# Patient Record
Sex: MALE | Race: WHITE | NOT HISPANIC OR LATINO | RURAL
[De-identification: names, ages, dates, MRNs, and addresses within clinical notes are randomized per-mention and may not be internally consistent; named-entity substitution may affect disease eponyms.]

---

## 2022-03-11 ENCOUNTER — OFFICE VISIT (OUTPATIENT)
Dept: RURAL CLINIC 2 | Facility: CLINIC | Age: 67
End: 2022-03-11
Payer: MEDICARE

## 2022-03-11 ENCOUNTER — LAB OUTSIDE AN ENCOUNTER (OUTPATIENT)
Dept: URBAN - METROPOLITAN AREA CLINIC 105 | Facility: CLINIC | Age: 67
End: 2022-03-11

## 2022-03-11 DIAGNOSIS — R19.8 BORBORYGMI: ICD-10-CM

## 2022-03-11 DIAGNOSIS — A09 DIARRHEA OF INFECTIOUS ORIGIN: ICD-10-CM

## 2022-03-11 DIAGNOSIS — R19.4 CHANGE IN BOWEL HABITS: ICD-10-CM

## 2022-03-11 DIAGNOSIS — R10.84 GENERALIZED ABDOMINAL PAIN: ICD-10-CM

## 2022-03-11 PROCEDURE — 99243 OFF/OP CNSLTJ NEW/EST LOW 30: CPT | Performed by: INTERNAL MEDICINE

## 2022-03-11 PROCEDURE — 99203 OFFICE O/P NEW LOW 30 MIN: CPT | Performed by: INTERNAL MEDICINE

## 2022-03-11 RX ORDER — CHROMIUM 200 MCG
TABLET ORAL
Qty: 0 | Refills: 0 | Status: DISCONTINUED | COMMUNITY
Start: 1900-01-01

## 2022-03-11 RX ORDER — SILDENAFIL 20 MG/1
1 TABLET TABLET, FILM COATED ORAL ONCE A DAY
Status: ACTIVE | COMMUNITY

## 2022-03-11 NOTE — HPI-TODAY'S VISIT:
Patient comes on referral from Dr. Justin Bright.  A copy of the consultation will be sent to the referring physician.  In March of 2017 Dr. Colunga did this patient screening colonoscopy that was normal. -  patient has been frequent loose stools for the last 2 months.  He had stool studies showing a negative stool culture negative C diff Giardia and O&P. He says that the middle of December he had what he thinks was covid. in January he had "14 straight days of diarrhea".  - On a bad at its worst, can go up to 8 times. Eating triggers. it is totally liquid and explosive. He has had a very few nocturnal stools. No blood in the stool.  no vomiting. no accidents but close calls.

## 2022-03-16 LAB
CALPROTECTIN, STOOL - QDX: (no result)
FECAL FAT, QUALITATIVE: (no result)
GASTROINTESTINAL PATHOGEN: (no result)
PANCREATICELASTASE ELISA, STOOL: (no result)

## 2022-03-17 ENCOUNTER — TELEPHONE ENCOUNTER (OUTPATIENT)
Dept: URBAN - METROPOLITAN AREA CLINIC 105 | Facility: CLINIC | Age: 67
End: 2022-03-17

## 2022-03-17 RX ORDER — PANCRELIPASE LIPASE, PANCRELIPASE PROTEASE, PANCRELIPASE AMYLASE 40000; 126000; 168000 [USP'U]/1; [USP'U]/1; [USP'U]/1
2 CAPSULES CAPSULE, DELAYED RELEASE ORAL
Qty: 200 | Refills: 5 | OUTPATIENT
Start: 2022-03-17 | End: 2022-09-13

## 2022-04-15 ENCOUNTER — OFFICE VISIT (OUTPATIENT)
Dept: RURAL CLINIC 2 | Facility: CLINIC | Age: 67
End: 2022-04-15
Payer: MEDICARE

## 2022-04-15 DIAGNOSIS — R14.0 BLOATING: ICD-10-CM

## 2022-04-15 DIAGNOSIS — R10.84 GENERALIZED ABDOMINAL PAIN: ICD-10-CM

## 2022-04-15 DIAGNOSIS — R14.3 FLATULENCE: ICD-10-CM

## 2022-04-15 DIAGNOSIS — K86.81 EXOCRINE PANCREATIC INSUFFICIENCY: ICD-10-CM

## 2022-04-15 PROCEDURE — 99214 OFFICE O/P EST MOD 30 MIN: CPT | Performed by: INTERNAL MEDICINE

## 2022-04-15 RX ORDER — SILDENAFIL 20 MG/1
1 TABLET TABLET, FILM COATED ORAL ONCE A DAY
COMMUNITY

## 2022-04-15 RX ORDER — PANCRELIPASE LIPASE, PANCRELIPASE PROTEASE, PANCRELIPASE AMYLASE 40000; 126000; 168000 [USP'U]/1; [USP'U]/1; [USP'U]/1
2 CAPSULES CAPSULE, DELAYED RELEASE ORAL
Qty: 200 | Refills: 5 | COMMUNITY
Start: 2022-03-17 | End: 2022-09-13

## 2022-04-15 NOTE — HPI-TODAY'S VISIT:
Patient comes on referral from Dr. Justin Bright.  A copy of the consultation will be sent to the referring physician.  In March of 2017 Dr. Colunga did this patient screening colonoscopy that was normal. -  patient has been frequent loose stools for the last 2 months.  He had stool studies showing a negative stool culture negative C diff Giardia and O&P. He says that the middle of December he had what he thinks was covid. in January he had "14 straight days of diarrhea".  - On a bad at its worst, can go up to 8 times. Eating triggers. it is totally liquid and explosive. He has had a very few nocturnal stools. No blood in the stool.  no vomiting. no accidents but close calls. - 4/15/2022: stool studies showed a slightly elevated calprotectin and EPI. started him on zenpep. he has been doing better. has still had a few loose stools. he says that on a bad day before he would have 10. now he is going only about 3 times. He feels much better and stronger.

## 2022-05-09 ENCOUNTER — TELEPHONE ENCOUNTER (OUTPATIENT)
Dept: URBAN - METROPOLITAN AREA CLINIC 92 | Facility: CLINIC | Age: 67
End: 2022-05-09

## 2022-05-13 ENCOUNTER — TELEPHONE ENCOUNTER (OUTPATIENT)
Dept: URBAN - METROPOLITAN AREA CLINIC 92 | Facility: CLINIC | Age: 67
End: 2022-05-13

## 2022-05-16 ENCOUNTER — LAB OUTSIDE AN ENCOUNTER (OUTPATIENT)
Dept: URBAN - METROPOLITAN AREA CLINIC 105 | Facility: CLINIC | Age: 67
End: 2022-05-16

## 2022-05-19 LAB
CALPROTECTIN, STOOL - QDX: (no result)
GASTROINTESTINAL PATHOGEN: (no result)

## 2022-07-17 ENCOUNTER — WEB ENCOUNTER (OUTPATIENT)
Dept: RURAL CLINIC 2 | Facility: CLINIC | Age: 67
End: 2022-07-17

## 2022-08-11 ENCOUNTER — OFFICE VISIT (OUTPATIENT)
Dept: RURAL CLINIC 2 | Facility: CLINIC | Age: 67
End: 2022-08-11
Payer: MEDICARE

## 2022-08-11 VITALS
HEIGHT: 70 IN | HEART RATE: 61 BPM | BODY MASS INDEX: 22.9 KG/M2 | SYSTOLIC BLOOD PRESSURE: 119 MMHG | TEMPERATURE: 98 F | DIASTOLIC BLOOD PRESSURE: 80 MMHG | WEIGHT: 160 LBS

## 2022-08-11 DIAGNOSIS — K86.81 EXOCRINE PANCREATIC INSUFFICIENCY: ICD-10-CM

## 2022-08-11 DIAGNOSIS — R19.5 LOOSE STOOLS: ICD-10-CM

## 2022-08-11 DIAGNOSIS — N28.1 RENAL CYST: ICD-10-CM

## 2022-08-11 PROCEDURE — 99214 OFFICE O/P EST MOD 30 MIN: CPT | Performed by: INTERNAL MEDICINE

## 2022-08-11 RX ORDER — PANCRELIPASE LIPASE, PANCRELIPASE PROTEASE, PANCRELIPASE AMYLASE 40000; 126000; 168000 [USP'U]/1; [USP'U]/1; [USP'U]/1
2 CAPSULES CAPSULE, DELAYED RELEASE ORAL
Qty: 200 | Refills: 5 | Status: ACTIVE | COMMUNITY
Start: 2022-03-17 | End: 2022-09-13

## 2022-08-11 RX ORDER — SILDENAFIL 20 MG/1
1 TABLET TABLET, FILM COATED ORAL ONCE A DAY
Status: ACTIVE | COMMUNITY

## 2022-08-11 NOTE — HPI-TODAY'S VISIT:
Patient comes on referral from Dr. Justin Bright.  A copy of the consultation will be sent to the referring physician.  In March of 2017 Dr. Colunga did this patient screening colonoscopy that was normal. -  patient has been frequent loose stools for the last 2 months.  He had stool studies showing a negative stool culture negative C diff Giardia and O&P. He says that the middle of December he had what he thinks was covid. in January he had "14 straight days of diarrhea".  - On a bad at its worst, can go up to 8 times. Eating triggers. it is totally liquid and explosive. He has had a very few nocturnal stools. No blood in the stool.  no vomiting. no accidents but close calls. - 4/15/2022: stool studies showed a slightly elevated calprotectin and EPI. started him on zenpep. he has been doing better. has still had a few loose stools. he says that on a bad day before he would have 10. now he is going only about 3 times. He feels much better and stronger. -  08/11/2022: Patient comes for follow-up.  He has exocrine pancreatic insufficiency and had a very good response to Zenpep.  He also wanted to do a ultrasound of the abdomen.  We did that it showed multiple renal cysts. - he said that he had covid a few weeks ago. he is doing well from diarrhea standpoint.

## 2022-09-16 ENCOUNTER — TELEPHONE ENCOUNTER (OUTPATIENT)
Dept: URBAN - METROPOLITAN AREA CLINIC 92 | Facility: CLINIC | Age: 67
End: 2022-09-16

## 2022-09-16 RX ORDER — PANCRELIPASE LIPASE, PANCRELIPASE PROTEASE, PANCRELIPASE AMYLASE 40000; 126000; 168000 [USP'U]/1; [USP'U]/1; [USP'U]/1
2 CAPSULES CAPSULE, DELAYED RELEASE ORAL
Qty: 200 | Refills: 5
Start: 2022-03-17 | End: 2023-03-15

## 2023-03-12 ENCOUNTER — ERX REFILL RESPONSE (OUTPATIENT)
Dept: RURAL CLINIC 2 | Facility: CLINIC | Age: 68
End: 2023-03-12

## 2023-03-12 RX ORDER — PANCRELIPASE LIPASE, PANCRELIPASE PROTEASE, PANCRELIPASE AMYLASE 40000; 126000; 168000 [USP'U]/1; [USP'U]/1; [USP'U]/1
2 CAPSULES ORALLY 3 TIMES A DAY WITH MEALS 30 DAYS CAPSULE, DELAYED RELEASE ORAL
Qty: 200 CAPSULE | Refills: 5 | OUTPATIENT

## 2023-03-12 RX ORDER — PANCRELIPASE LIPASE, PANCRELIPASE PROTEASE, PANCRELIPASE AMYLASE 40000; 126000; 168000 [USP'U]/1; [USP'U]/1; [USP'U]/1
2 CAPSULES CAPSULE, DELAYED RELEASE ORAL
Qty: 200 | Refills: 5 | OUTPATIENT

## 2023-06-09 ENCOUNTER — LAB OUTSIDE AN ENCOUNTER (OUTPATIENT)
Dept: RURAL CLINIC 2 | Facility: CLINIC | Age: 68
End: 2023-06-09

## 2023-06-09 ENCOUNTER — OFFICE VISIT (OUTPATIENT)
Dept: RURAL CLINIC 2 | Facility: CLINIC | Age: 68
End: 2023-06-09
Payer: MEDICARE

## 2023-06-09 VITALS
SYSTOLIC BLOOD PRESSURE: 131 MMHG | BODY MASS INDEX: 23.62 KG/M2 | HEIGHT: 70 IN | WEIGHT: 165 LBS | DIASTOLIC BLOOD PRESSURE: 79 MMHG | TEMPERATURE: 97.5 F | HEART RATE: 68 BPM

## 2023-06-09 DIAGNOSIS — K86.81 EXOCRINE PANCREATIC INSUFFICIENCY: ICD-10-CM

## 2023-06-09 DIAGNOSIS — K21.9 GASTROESOPHAGEAL REFLUX DISEASE, UNSPECIFIED WHETHER ESOPHAGITIS PRESENT: ICD-10-CM

## 2023-06-09 PROBLEM — 47367009: Status: ACTIVE | Noted: 2023-06-09

## 2023-06-09 PROBLEM — 235595009: Status: ACTIVE | Noted: 2023-06-09

## 2023-06-09 PROCEDURE — 99213 OFFICE O/P EST LOW 20 MIN: CPT | Performed by: NURSE PRACTITIONER

## 2023-06-09 RX ORDER — SILDENAFIL 20 MG/1
1 TABLET TABLET, FILM COATED ORAL ONCE A DAY
Status: DISCONTINUED | COMMUNITY

## 2023-06-09 RX ORDER — PANCRELIPASE LIPASE, PANCRELIPASE PROTEASE, PANCRELIPASE AMYLASE 40000; 126000; 168000 [USP'U]/1; [USP'U]/1; [USP'U]/1
AS DIRECTED CAPSULE, DELAYED RELEASE ORAL
Qty: 250 | Refills: 11 | OUTPATIENT
Start: 2023-06-09 | End: 2024-06-03

## 2023-06-09 RX ORDER — LANSOPRAZOLE 30 MG/1
1 CAPSULE BEFORE A MEAL CAPSULE, DELAYED RELEASE ORAL ONCE A DAY
Status: ACTIVE | COMMUNITY

## 2023-06-09 RX ORDER — PANCRELIPASE LIPASE, PANCRELIPASE PROTEASE, PANCRELIPASE AMYLASE 40000; 126000; 168000 [USP'U]/1; [USP'U]/1; [USP'U]/1
2 CAPSULES ORALLY 3 TIMES A DAY WITH MEALS 30 DAYS CAPSULE, DELAYED RELEASE ORAL
Qty: 200 CAPSULE | Refills: 5 | Status: ACTIVE | COMMUNITY

## 2023-06-09 NOTE — HPI-TODAY'S VISIT:
Patient comes on referral from Dr. Justin Bright.  A copy of the consultation will be sent to the referring physician.  In March of 2017 Dr. Colunga did this patient screening colonoscopy that was normal. -  patient has been frequent loose stools for the last 2 months.  He had stool studies showing a negative stool culture negative C diff Giardia and O&P. He says that the middle of December he had what he thinks was covid. in January he had 14 straight days of diarrhea.  - On a bad at its worst, can go up to 8 times. Eating triggers. it is totally liquid and explosive. He has had a very few nocturnal stools. No blood in the stool.  no vomiting. no accidents but close calls. - 4/15/2022: stool studies showed a slightly elevated calprotectin and EPI. started him on zenpep. he has been doing better. has still had a few loose stools. he says that on a bad day before he would have 10. now he is going only about 3 times. He feels much better and stronger. -  08/11/2022: Patient comes for follow-up.  He has exocrine pancreatic insufficiency and had a very good response to Zenpep.  He also wanted to do a ultrasound of the abdomen.  We did that it showed multiple renal cysts. - he said that he had covid a few weeks ago. he is doing well from diarrhea standpoint.  06/09/2023 The patient reports worsening  GERD over the past several weeks with multiple episodes of acid reflux. He is currnetly taking  OTC Prevacid and is about 50 percent better. He denies abdominal pain and dysphagia. He denies undergoing an EGD in the past. He continues on Zenpep for EPI. He has had a few episodes of loose stool and resolved after  taking a third dose of Zenpep  with a bigger meal.

## 2023-08-25 ENCOUNTER — CLAIMS CREATED FROM THE CLAIM WINDOW (OUTPATIENT)
Dept: RURAL MEDICAL CENTER 4 | Facility: MEDICAL CENTER | Age: 68
End: 2023-08-25

## 2023-08-25 ENCOUNTER — CLAIMS CREATED FROM THE CLAIM WINDOW (OUTPATIENT)
Dept: RURAL MEDICAL CENTER 4 | Facility: MEDICAL CENTER | Age: 68
End: 2023-08-25
Payer: MEDICARE

## 2023-08-25 ENCOUNTER — OFFICE VISIT (OUTPATIENT)
Dept: RURAL MEDICAL CENTER 4 | Facility: MEDICAL CENTER | Age: 68
End: 2023-08-25

## 2023-08-25 DIAGNOSIS — K21.9 ACID REFLUX: ICD-10-CM

## 2023-08-25 DIAGNOSIS — K29.60 ADENOPAPILLOMATOSIS GASTRICA: ICD-10-CM

## 2023-08-25 PROCEDURE — 43239 EGD BIOPSY SINGLE/MULTIPLE: CPT | Performed by: INTERNAL MEDICINE

## 2023-08-25 RX ORDER — PANCRELIPASE LIPASE, PANCRELIPASE PROTEASE, PANCRELIPASE AMYLASE 40000; 126000; 168000 [USP'U]/1; [USP'U]/1; [USP'U]/1
2 CAPSULES ORALLY 3 TIMES A DAY WITH MEALS 30 DAYS CAPSULE, DELAYED RELEASE ORAL
Qty: 200 CAPSULE | Refills: 5 | Status: ACTIVE | COMMUNITY

## 2023-08-25 RX ORDER — FAMOTIDINE 40 MG/1
1 TABLET TABLET, FILM COATED ORAL
Qty: 90 | Refills: 3 | OUTPATIENT
Start: 2023-08-25

## 2023-08-25 RX ORDER — PANCRELIPASE LIPASE, PANCRELIPASE PROTEASE, PANCRELIPASE AMYLASE 40000; 126000; 168000 [USP'U]/1; [USP'U]/1; [USP'U]/1
AS DIRECTED CAPSULE, DELAYED RELEASE ORAL
Qty: 250 | Refills: 11 | Status: ACTIVE | COMMUNITY
Start: 2023-06-09 | End: 2024-06-03

## 2023-08-25 RX ORDER — LANSOPRAZOLE 30 MG/1
1 CAPSULE BEFORE A MEAL CAPSULE, DELAYED RELEASE ORAL ONCE A DAY
Status: ACTIVE | COMMUNITY

## 2023-09-08 ENCOUNTER — DASHBOARD ENCOUNTERS (OUTPATIENT)
Age: 68
End: 2023-09-08

## 2023-09-08 ENCOUNTER — OFFICE VISIT (OUTPATIENT)
Dept: RURAL CLINIC 2 | Facility: CLINIC | Age: 68
End: 2023-09-08
Payer: MEDICARE

## 2023-09-08 VITALS
DIASTOLIC BLOOD PRESSURE: 77 MMHG | SYSTOLIC BLOOD PRESSURE: 121 MMHG | TEMPERATURE: 97.1 F | WEIGHT: 164 LBS | HEART RATE: 55 BPM | HEIGHT: 70 IN | BODY MASS INDEX: 23.48 KG/M2

## 2023-09-08 DIAGNOSIS — K21.9 GASTROESOPHAGEAL REFLUX DISEASE WITHOUT ESOPHAGITIS: ICD-10-CM

## 2023-09-08 DIAGNOSIS — R53.82 CHRONIC FATIGUE: ICD-10-CM

## 2023-09-08 DIAGNOSIS — K31.9 ANTRAL ERYTHEMA: ICD-10-CM

## 2023-09-08 PROBLEM — 84229001: Status: ACTIVE | Noted: 2023-09-08

## 2023-09-08 PROBLEM — 266435005: Status: ACTIVE | Noted: 2023-09-08

## 2023-09-08 PROBLEM — 29384001: Status: ACTIVE | Noted: 2023-09-08

## 2023-09-08 PROCEDURE — 99213 OFFICE O/P EST LOW 20 MIN: CPT | Performed by: NURSE PRACTITIONER

## 2023-09-08 RX ORDER — LANSOPRAZOLE 30 MG/1
1 CAPSULE BEFORE A MEAL CAPSULE, DELAYED RELEASE ORAL ONCE A DAY
COMMUNITY

## 2023-09-08 RX ORDER — FAMOTIDINE 40 MG/1
1 TABLET TABLET, FILM COATED ORAL
Qty: 90 | Refills: 3 | COMMUNITY
Start: 2023-08-25

## 2023-09-08 RX ORDER — PANCRELIPASE LIPASE, PANCRELIPASE PROTEASE, PANCRELIPASE AMYLASE 40000; 126000; 168000 [USP'U]/1; [USP'U]/1; [USP'U]/1
2 CAPSULES ORALLY 3 TIMES A DAY WITH MEALS 30 DAYS CAPSULE, DELAYED RELEASE ORAL
Qty: 200 CAPSULE | Refills: 5 | COMMUNITY

## 2023-09-08 RX ORDER — PANCRELIPASE LIPASE, PANCRELIPASE PROTEASE, PANCRELIPASE AMYLASE 40000; 126000; 168000 [USP'U]/1; [USP'U]/1; [USP'U]/1
AS DIRECTED CAPSULE, DELAYED RELEASE ORAL
Qty: 250 | Refills: 11 | COMMUNITY
Start: 2023-06-09 | End: 2024-06-03

## 2023-09-08 NOTE — HPI-ZZZTODAY'S VISIT
The patient returns for follow-up after undergoing an upper endoscopy for chronic GERD with breakthrough symptoms on PPI therapy with findings of mild erythema of the antrum, otherwise normal.  Biopsies to evaluate for H. pylori bacteria was negative.  The patient continues on Prevacid during the day and famotidine at bedtime as needed and is working well. He reportedly is currently under a serology w/u for chronic fatigue with his PCP. No current abnormalities thus far.

## 2023-09-21 ENCOUNTER — WEB ENCOUNTER (OUTPATIENT)
Dept: RURAL CLINIC 2 | Facility: CLINIC | Age: 68
End: 2023-09-21

## 2023-09-21 RX ORDER — PANCRELIPASE LIPASE, PANCRELIPASE PROTEASE, PANCRELIPASE AMYLASE 40000; 126000; 168000 [USP'U]/1; [USP'U]/1; [USP'U]/1
2 CAPSULES ORALLY 3 TIMES A DAY WITH MEALS 30 DAYS CAPSULE, DELAYED RELEASE ORAL
Qty: 200 CAPSULE | Refills: 5

## 2023-09-22 ENCOUNTER — WEB ENCOUNTER (OUTPATIENT)
Dept: RURAL CLINIC 2 | Facility: CLINIC | Age: 68
End: 2023-09-22

## 2023-09-25 ENCOUNTER — WEB ENCOUNTER (OUTPATIENT)
Dept: RURAL CLINIC 2 | Facility: CLINIC | Age: 68
End: 2023-09-25

## 2024-09-20 ENCOUNTER — ERX REFILL RESPONSE (OUTPATIENT)
Dept: RURAL CLINIC 2 | Facility: CLINIC | Age: 69
End: 2024-09-20

## 2024-09-20 RX ORDER — PANCRELIPASE LIPASE, PANCRELIPASE PROTEASE, PANCRELIPASE AMYLASE 40000; 126000; 168000 [USP'U]/1; [USP'U]/1; [USP'U]/1
2 CAPSULES ORALLY 3 TIMES A DAY WITH MEALS 30 DAYS CAPSULE, DELAYED RELEASE ORAL
Qty: 200 CAPSULE | Refills: 5 | OUTPATIENT

## 2024-09-20 RX ORDER — PANCRELIPASE LIPASE, PANCRELIPASE PROTEASE, PANCRELIPASE AMYLASE 40000; 126000; 168000 [USP'U]/1; [USP'U]/1; [USP'U]/1
2 CAPSULES ORALLY 3 TIMES A DAY WITH MEALS 30 DAYS 33 CAPSULE, DELAYED RELEASE ORAL
Qty: 200 CAPSULE | Refills: 5 | OUTPATIENT

## 2024-12-11 ENCOUNTER — OFFICE VISIT (OUTPATIENT)
Dept: RURAL CLINIC 2 | Facility: CLINIC | Age: 69
End: 2024-12-11
Payer: COMMERCIAL

## 2024-12-11 VITALS
SYSTOLIC BLOOD PRESSURE: 122 MMHG | HEART RATE: 61 BPM | DIASTOLIC BLOOD PRESSURE: 82 MMHG | TEMPERATURE: 97.1 F | WEIGHT: 168 LBS | HEIGHT: 70 IN | BODY MASS INDEX: 24.05 KG/M2

## 2024-12-11 DIAGNOSIS — K21.9 GERD WITHOUT ESOPHAGITIS: ICD-10-CM

## 2024-12-11 DIAGNOSIS — K86.81 EXOCRINE PANCREATIC INSUFFICIENCY: ICD-10-CM

## 2024-12-11 PROBLEM — 266435005: Status: ACTIVE | Noted: 2024-12-11

## 2024-12-11 PROCEDURE — 99213 OFFICE O/P EST LOW 20 MIN: CPT | Performed by: NURSE PRACTITIONER

## 2024-12-11 RX ORDER — PANCRELIPASE LIPASE, PANCRELIPASE PROTEASE, PANCRELIPASE AMYLASE 40000; 126000; 168000 [USP'U]/1; [USP'U]/1; [USP'U]/1
2 CAPSULES WITH MEALS AND 1 WITH SNACKS CAPSULE, DELAYED RELEASE ORAL
Qty: 900 CAPSULES | Refills: 3 | OUTPATIENT
Start: 2024-12-11 | End: 2025-12-06

## 2024-12-11 RX ORDER — PANCRELIPASE LIPASE, PANCRELIPASE PROTEASE, PANCRELIPASE AMYLASE 40000; 126000; 168000 [USP'U]/1; [USP'U]/1; [USP'U]/1
2 CAPSULES ORALLY 3 TIMES A DAY WITH MEALS 30 DAYS CAPSULE, DELAYED RELEASE ORAL
Qty: 200 CAPSULE | Refills: 5 | Status: ACTIVE | COMMUNITY

## 2024-12-11 RX ORDER — LANSOPRAZOLE 30 MG/1
1 CAPSULE BEFORE A MEAL CAPSULE, DELAYED RELEASE ORAL ONCE A DAY
Status: ACTIVE | COMMUNITY

## 2024-12-11 RX ORDER — FAMOTIDINE 40 MG/1
1 TABLET TABLET, FILM COATED ORAL
Qty: 90 | Refills: 3 | Status: ACTIVE | COMMUNITY
Start: 2023-08-25

## 2024-12-11 RX ORDER — LANSOPRAZOLE 30 MG/1
1 CAPSULE 1/2 TO 1 HOUR BEFORE MORNING MEAL CAPSULE, DELAYED RELEASE PELLETS ORAL ONCE A DAY
Qty: 90 | Refills: 3 | OUTPATIENT
Start: 2024-12-11

## 2024-12-11 RX ORDER — FAMOTIDINE 40 MG/1
1 TABLET AT BEDTIME TABLET, FILM COATED ORAL ONCE A DAY
Qty: 90 TABLET | Refills: 3 | OUTPATIENT
Start: 2024-12-11

## 2024-12-11 NOTE — HPI-ZZZTODAY'S VISIT
The patient returns for follow-up after undergoing an upper endoscopy for chronic GERD with breakthrough symptoms on PPI therapy with findings of mild erythema of the antrum, otherwise normal.  Biopsies to evaluate for H. pylori bacteria was negative.  The patient continues on Prevacid during the day and famotidine at bedtime as needed and is working well. He reportedly is currently under a serology w/u for chronic fatigue with his PCP. No current abnormalities thus far. 12/11/2024 The pt is following up for his annual appointment for h/o GERD,  exocrine pancreatic insufficiency and medication refills. He continues on Prevacid and famotidine with good control of GERD. He is also adhering to an antireflux diet most of the time. Previous EGD was done 18 months ago revealing mild gastritis otherwise normal. He continues on Zenpep with his meals and snacks reporting a normal bowel movement up to 2  times daily.